# Patient Record
Sex: MALE | Race: WHITE | NOT HISPANIC OR LATINO | URBAN - METROPOLITAN AREA
[De-identification: names, ages, dates, MRNs, and addresses within clinical notes are randomized per-mention and may not be internally consistent; named-entity substitution may affect disease eponyms.]

---

## 2017-01-25 ENCOUNTER — COMMUNICATION - HEALTHEAST (OUTPATIENT)
Dept: FAMILY MEDICINE | Facility: CLINIC | Age: 67
End: 2017-01-25

## 2017-01-25 DIAGNOSIS — K21.9 GERD (GASTROESOPHAGEAL REFLUX DISEASE): ICD-10-CM

## 2017-01-25 DIAGNOSIS — E78.5 HYPERLIPEMIA: ICD-10-CM

## 2017-01-25 DIAGNOSIS — N40.0 BPH (BENIGN PROSTATIC HYPERPLASIA): ICD-10-CM

## 2017-04-23 ENCOUNTER — COMMUNICATION - HEALTHEAST (OUTPATIENT)
Dept: FAMILY MEDICINE | Facility: CLINIC | Age: 67
End: 2017-04-23

## 2017-04-23 DIAGNOSIS — K21.9 GERD (GASTROESOPHAGEAL REFLUX DISEASE): ICD-10-CM

## 2017-04-23 DIAGNOSIS — N40.0 BPH (BENIGN PROSTATIC HYPERPLASIA): ICD-10-CM

## 2017-04-23 DIAGNOSIS — E78.5 HYPERLIPEMIA: ICD-10-CM

## 2017-04-23 DIAGNOSIS — I10 UNSPECIFIED ESSENTIAL HYPERTENSION: ICD-10-CM

## 2017-06-08 ENCOUNTER — OFFICE VISIT - HEALTHEAST (OUTPATIENT)
Dept: FAMILY MEDICINE | Facility: CLINIC | Age: 67
End: 2017-06-08

## 2017-06-08 ENCOUNTER — COMMUNICATION - HEALTHEAST (OUTPATIENT)
Dept: FAMILY MEDICINE | Facility: CLINIC | Age: 67
End: 2017-06-08

## 2017-06-08 DIAGNOSIS — E78.5 HYPERLIPEMIA: ICD-10-CM

## 2017-06-08 DIAGNOSIS — Z00.00 ROUTINE GENERAL MEDICAL EXAMINATION AT A HEALTH CARE FACILITY: ICD-10-CM

## 2017-06-08 DIAGNOSIS — N40.0 BPH (BENIGN PROSTATIC HYPERPLASIA): ICD-10-CM

## 2017-06-08 DIAGNOSIS — K21.9 GERD (GASTROESOPHAGEAL REFLUX DISEASE): ICD-10-CM

## 2017-06-08 DIAGNOSIS — Z23 NEED FOR VACCINATION: ICD-10-CM

## 2017-06-08 DIAGNOSIS — I10 UNSPECIFIED ESSENTIAL HYPERTENSION: ICD-10-CM

## 2017-06-08 LAB
ALT SERPL W P-5'-P-CCNC: 126 U/L (ref 0–45)
CHOLEST SERPL-MCNC: 207 MG/DL
FASTING STATUS PATIENT QL REPORTED: YES
HDLC SERPL-MCNC: 40 MG/DL
LDLC SERPL CALC-MCNC: 134 MG/DL
PSA SERPL-MCNC: 3.7 NG/ML (ref 0–4.5)
TRIGL SERPL-MCNC: 167 MG/DL

## 2017-06-08 ASSESSMENT — MIFFLIN-ST. JEOR: SCORE: 2066.91

## 2017-06-08 NOTE — ASSESSMENT & PLAN NOTE
Controlled well on current proton pump inhibitor.  He has tried H2 blockers and failed in the past.  Will continue to watch kidney function as well as symptom control.

## 2017-06-08 NOTE — ASSESSMENT & PLAN NOTE
Tolerating Flomax well.  Will continue her current dosing.  Warning signs and symptoms for early return clinic discussed.

## 2018-05-28 ENCOUNTER — COMMUNICATION - HEALTHEAST (OUTPATIENT)
Dept: FAMILY MEDICINE | Facility: CLINIC | Age: 68
End: 2018-05-28

## 2018-05-28 DIAGNOSIS — K21.9 GERD (GASTROESOPHAGEAL REFLUX DISEASE): ICD-10-CM

## 2018-05-28 DIAGNOSIS — I10 ESSENTIAL HYPERTENSION: ICD-10-CM

## 2018-05-28 DIAGNOSIS — E78.5 HYPERLIPEMIA: ICD-10-CM

## 2018-08-08 ENCOUNTER — COMMUNICATION - HEALTHEAST (OUTPATIENT)
Dept: FAMILY MEDICINE | Facility: CLINIC | Age: 68
End: 2018-08-08

## 2018-08-08 DIAGNOSIS — N40.0 BPH (BENIGN PROSTATIC HYPERPLASIA): ICD-10-CM

## 2018-08-11 ENCOUNTER — COMMUNICATION - HEALTHEAST (OUTPATIENT)
Dept: FAMILY MEDICINE | Facility: CLINIC | Age: 68
End: 2018-08-11

## 2018-08-11 DIAGNOSIS — K21.9 GERD (GASTROESOPHAGEAL REFLUX DISEASE): ICD-10-CM

## 2018-08-11 DIAGNOSIS — I10 ESSENTIAL HYPERTENSION: ICD-10-CM

## 2018-08-11 DIAGNOSIS — E78.5 HYPERLIPEMIA: ICD-10-CM

## 2018-12-09 ENCOUNTER — COMMUNICATION - HEALTHEAST (OUTPATIENT)
Dept: FAMILY MEDICINE | Facility: CLINIC | Age: 68
End: 2018-12-09

## 2018-12-09 DIAGNOSIS — E78.5 HYPERLIPEMIA: ICD-10-CM

## 2018-12-09 DIAGNOSIS — I10 ESSENTIAL HYPERTENSION: ICD-10-CM

## 2018-12-09 DIAGNOSIS — K21.9 GERD (GASTROESOPHAGEAL REFLUX DISEASE): ICD-10-CM

## 2019-03-07 ENCOUNTER — COMMUNICATION - HEALTHEAST (OUTPATIENT)
Dept: FAMILY MEDICINE | Facility: CLINIC | Age: 69
End: 2019-03-07

## 2019-03-07 DIAGNOSIS — E78.5 HYPERLIPEMIA: ICD-10-CM

## 2019-03-07 DIAGNOSIS — I10 ESSENTIAL HYPERTENSION: ICD-10-CM

## 2019-03-07 DIAGNOSIS — K21.9 GERD (GASTROESOPHAGEAL REFLUX DISEASE): ICD-10-CM

## 2019-03-13 RX ORDER — HYDROCHLOROTHIAZIDE 25 MG/1
TABLET ORAL
Qty: 30 TABLET | Refills: 0 | Status: SHIPPED | OUTPATIENT
Start: 2019-03-13

## 2019-03-13 RX ORDER — SIMVASTATIN 10 MG
TABLET ORAL
Qty: 30 TABLET | Refills: 0 | Status: SHIPPED | OUTPATIENT
Start: 2019-03-13

## 2019-07-18 ENCOUNTER — COMMUNICATION - HEALTHEAST (OUTPATIENT)
Dept: FAMILY MEDICINE | Facility: CLINIC | Age: 69
End: 2019-07-18

## 2019-07-18 DIAGNOSIS — K21.9 GERD (GASTROESOPHAGEAL REFLUX DISEASE): ICD-10-CM

## 2019-07-18 DIAGNOSIS — I10 ESSENTIAL HYPERTENSION: ICD-10-CM

## 2019-07-26 ENCOUNTER — COMMUNICATION - HEALTHEAST (OUTPATIENT)
Dept: FAMILY MEDICINE | Facility: CLINIC | Age: 69
End: 2019-07-26

## 2019-07-26 DIAGNOSIS — N40.0 BPH (BENIGN PROSTATIC HYPERPLASIA): ICD-10-CM

## 2019-07-26 RX ORDER — TAMSULOSIN HYDROCHLORIDE 0.4 MG/1
0.4 CAPSULE ORAL DAILY
Qty: 90 CAPSULE | Refills: 3 | Status: SHIPPED | OUTPATIENT
Start: 2019-07-26

## 2021-05-30 NOTE — TELEPHONE ENCOUNTER
Requested Prescriptions     Pending Prescriptions Disp Refills     tamsulosin (FLOMAX) 0.4 mg cap 90 capsule 3     Sig: Take 1 capsule (0.4 mg total) by mouth daily.

## 2021-05-31 VITALS — WEIGHT: 295.3 LBS | BODY MASS INDEX: 43.74 KG/M2 | HEIGHT: 69 IN

## 2021-06-11 NOTE — PROGRESS NOTES
Assessment:      Annual Wellness Visit    1. Routine general medical examination at a health care facility  Basic Metabolic Panel    ALT (SGPT)    Lipid Cascade    PSA (Prostatic-Specific Antigen), Annual Screen   2. BPH (benign prostatic hyperplasia)  tamsulosin (FLOMAX) 0.4 mg Cp24    Basic Metabolic Panel    ALT (SGPT)    Lipid Cascade    PSA (Prostatic-Specific Antigen), Annual Screen   3. GERD (gastroesophageal reflux disease)  omeprazole (PRILOSEC) 20 MG capsule   4. Hyperlipemia  simvastatin (ZOCOR) 10 MG tablet    ALT (SGPT)    Lipid Cascade   5. Hypertension  hydroCHLOROthiazide (HYDRODIURIL) 25 MG tablet    Basic Metabolic Panel   6. Need for vaccination  Pneumococcal polysaccharide vaccine 23-valent 3 yo or older, subq/IM         Plan:         BPH (benign prostatic hyperplasia)  Tolerating Flomax well.  Will continue her current dosing.  Warning signs and symptoms for early return clinic discussed.    GERD (gastroesophageal reflux disease)  Controlled well on current proton pump inhibitor.  He has tried H2 blockers and failed in the past.  Will continue to watch kidney function as well as symptom control.    Hyperlipemia  Tolerating simvastatin very well.  Will continue her current dosing.  Will recheck levels as per guidelines    Hypertension  Diet and exercise reviewed.  Blood pressure well controlled without any signs of orthostatic hypotension on current dosing of hydrochlorothiazide 25 mg daily.  Will continue her current dosing.  Will check annual labs as per guidance.  Follow-up in 6 months for recheck.      Subjective:      Hugh Davidson is a 67 y.o. male who presents for a Subsequent Annual Wellness Visit.  Overall doing well.  Denies any chest pain, shortness breath, dyspnea exertion, palpitations, nausea or vomiting.  [facility].  A release of information was completed to bring those notes back over here.  They also do  help comanage with his hyperlipidemia and his blood pressure as well as his BPH.  [facility] is [facility].    Healthy Habits:   Regular Exercise: Yes  Sunscreen Use: Yes  Healthy Diet: Yes  Dental Visits Regularly: Yes  Seat Belt: Yes  Sexually active: Yes  Monthly Self Testicular Exams:  No  Hemoccults: No  Flex Sig: No  Colonoscopy: Yes  Lipid Profile: No  Glucose Screen: No  Prevention of Osteoporosis: No  Last Dexa: No  Guns at Home:  Yes  Guns Safety Locks:  Yes and Gun safe/class:  Yes      Immunization History   Administered Date(s) Administered     Influenza high dose, seasonal 10/21/2015     Influenza, inj, historic 09/01/2010, 10/12/2011, 10/26/2012     Pneumo Conj 13-V (2010&after) 10/21/2015     Pneumo Polysac 23-V 01/02/2006     Td, historic 10/01/2008     ZOSTER 02/09/2011     Immunization status: up to date and documented, due today.    Current Outpatient Prescriptions   Medication Sig Dispense Refill     hydroCHLOROthiazide (HYDRODIURIL) 25 MG tablet TAKE ONE TABLET BY MOUTH ONCE DAILY 30 tablet 0     omeprazole (PRILOSEC) 20 MG capsule TAKE ONE CAPSULE BY MOUTH ONCE DAILY 30 capsule 0     simvastatin (ZOCOR) 10 MG tablet TAKE ONE TABLET BY MOUTH ONCE DAILY AT BEDTIME 30 tablet 0     tamsulosin (FLOMAX) 0.4 mg Cp24 TAKE ONE CAPSULE BY MOUTH ONCE DAILY 90 capsule 0     No current facility-administered medications for this visit.      Past Medical History:   Diagnosis Date     GERD (gastroesophageal reflux disease)      Hypertension      Past Surgical History:   Procedure Laterality Date     HERNIA REPAIR       TONSILLECTOMY       Review of patient's allergies indicates no known allergies.  Family History   Problem Relation Age of Onset     Alcohol abuse Mother      Cancer Mother      Lung     Diverticulitis Father      Social History     Social History     Marital status: Patient Declined     Spouse name: N/A     Number of children: N/A     Years of education: N/A     Occupational  "History     Not on file.     Social History Main Topics     Smoking status: Current Some Day Smoker     Packs/day: 0.25     Years: 1.00     Types: Cigars     Smokeless tobacco: Never Used     Alcohol use Yes     1 Glasses of wine, 1 Shots of liquor per week     Drug use: No     Sexual activity: Yes     Partners: Female     Birth control/ protection: None     Other Topics Concern     Not on file     Social History Narrative       Current Diet:  well balanced diet  Amount Consumed Per Day  3 servings of meat each day  4 servings of dairy each day  3 servings of fruit  4 servings of vegetables  1 cups of coffee    Exercise Type: walking  Exercise Frequency: Daily exercise    Mood Disorder and Cognitive Impairment Screenings  Anxiety Screening Tool:  GAD7       Anxiety Screening Tool Score:  0  Depression Screening Tool:  PHQ9    Depression Screening Tool Score:  0  Cognitive Impairment and Additional Screening:  No difficulty.    Functional Ability/Level of Safety  Fall Risk Factors:  antihypertensive use  Home Safety Risk Factors: no grab bars, bathroom    Advanced Directive:  I have had an Advanced Directive discussion with the patient. End of life decisions were reviewed with the patient and I agree with the patient's decisions.    Co-Managers and Medical Equipment/Suppliers:  [facility]    Review of Systems  Review of Systems   All other systems reviewed and are negative.            Objective:     Vitals:    06/08/17 0914   BP: 110/80   Pulse: 64   Resp: 12   Temp: 97.6  F (36.4  C)   TempSrc: Oral   Weight: (!) 295 lb 4.8 oz (133.9 kg)   Height: 5' 8.5\" (1.74 m)           Physical Exam   Constitutional: He appears well-developed and well-nourished.   HENT:   Head: Normocephalic and atraumatic.   Right Ear: External ear normal.   Left Ear: External ear normal.   Nose: Nose normal.   Mouth/Throat: Oropharynx is clear and moist.   Eyes: Conjunctivae and EOM are " normal. Pupils are equal, round, and reactive to light.   Neck: Normal range of motion.   Cardiovascular: Normal rate, regular rhythm, normal heart sounds and intact distal pulses.    Pulmonary/Chest: Effort normal and breath sounds normal.   Abdominal: Soft.   Neurological: He has normal reflexes.   Skin: Skin is warm and dry.   Psychiatric: He has a normal mood and affect.   Vitals reviewed.

## 2021-06-16 PROBLEM — E78.5 HYPERLIPEMIA: Status: ACTIVE | Noted: 2017-06-08

## 2021-06-16 PROBLEM — I10 ESSENTIAL HYPERTENSION: Status: ACTIVE | Noted: 2017-06-08

## 2021-06-16 PROBLEM — K21.9 GERD (GASTROESOPHAGEAL REFLUX DISEASE): Status: ACTIVE | Noted: 2017-06-08

## 2021-06-16 PROBLEM — N40.0 BPH (BENIGN PROSTATIC HYPERPLASIA): Status: ACTIVE | Noted: 2017-06-08

## 2021-06-24 NOTE — TELEPHONE ENCOUNTER
RN cannot approve Refill Request    RN can NOT refill this medication Protocol failed and NO refill given. Last office visit: 11/12/2015 Golden Hawkins DO Last Physical: 6/8/2017 Last MTM visit: Visit date not found Last visit same specialty: 11/12/2015 Golden Hawkins DO.  Next visit within 3 mo: Visit date not found  Next physical within 3 mo: Visit date not found      Brooke Rooney, [facility] Triage/Med Refill 3/13/2019    Requested Prescriptions   Pending Prescriptions Disp Refills     simvastatin (ZOCOR) 10 MG tablet [Pharmacy Med Name: SIMVASTATIN 10MG    TAB] 90 tablet 0     Sig: TAKE 1 TABLET BY MOUTH ONCE DAILY AT BEDTIME    Statins Refill Protocol (Hmg CoA Reductase Inhibitors) Failed - 3/7/2019 12:12 PM       Failed - PCP or prescribing provider visit in past 12 months     Last office visit with prescriber/PCP: 11/12/2015 Golden Hawkins DO OR same dept: Visit date not found OR same specialty: 11/12/2015 Golden Hawkins DO  Last physical: 6/8/2017 Last MTM visit: Visit date not found   Next visit within 3 mo: Visit date not found  Next physical within 3 mo: Visit date not found  Prescriber OR PCP: Golden Hawkins DO  Last diagnosis associated with med order: 1. Hyperlipemia  - simvastatin (ZOCOR) 10 MG tablet [Pharmacy Med Name: SIMVASTATIN 10MG    TAB]; TAKE 1 TABLET BY MOUTH ONCE DAILY AT BEDTIME  Dispense: 90 tablet; Refill: 0    2. Essential hypertension  - hydroCHLOROthiazide (HYDRODIURIL) 25 MG tablet [Pharmacy Med Name: HYDROCHLOROT 25MG   TAB]; TAKE 1 TABLET BY MOUTH ONCE DAILY  Dispense: 90 tablet; Refill: 0    3. GERD (gastroesophageal reflux disease)  - omeprazole (PRILOSEC) 20 MG capsule [Pharmacy Med Name: OMEPRAZOLE 20MG CAP]; TAKE 1 CAPSULE BY MOUTH ONCE DAILY  Dispense: 90 capsule; Refill: 0    If protocol passes may refill for 12 months if within 3 months of last provider visit (or a total of 15 months).              hydroCHLOROthiazide (HYDRODIURIL) 25 MG tablet [Pharmacy Med Name: HYDROCHLOROT 25MG   TAB] 90 tablet 0     Sig: TAKE 1 TABLET BY MOUTH ONCE DAILY    Diuretics/Combination Diuretics Refill Protocol  Failed - 3/7/2019 12:12 PM       Failed - Visit with PCP or prescribing provider visit in past 12 months    Last office visit with prescriber/PCP: 11/12/2015 Golden Hawkins DO OR same dept: Visit date not found OR same specialty: 11/12/2015 Golden Hawkins DO  Last physical: 6/8/2017 Last MTM visit: Visit date not found   Next visit within 3 mo: Visit date not found  Next physical within 3 mo: Visit date not found  Prescriber OR PCP: Golden Hawkins DO  Last diagnosis associated with med order: 1. Hyperlipemia  - simvastatin (ZOCOR) 10 MG tablet [Pharmacy Med Name: SIMVASTATIN 10MG    TAB]; TAKE 1 TABLET BY MOUTH ONCE DAILY AT BEDTIME  Dispense: 90 tablet; Refill: 0    2. Essential hypertension  - hydroCHLOROthiazide (HYDRODIURIL) 25 MG tablet [Pharmacy Med Name: HYDROCHLOROT 25MG   TAB]; TAKE 1 TABLET BY MOUTH ONCE DAILY  Dispense: 90 tablet; Refill: 0    3. GERD (gastroesophageal reflux disease)  - omeprazole (PRILOSEC) 20 MG capsule [Pharmacy Med Name: OMEPRAZOLE 20MG CAP]; TAKE 1 CAPSULE BY MOUTH ONCE DAILY  Dispense: 90 capsule; Refill: 0    If protocol passes may refill for 12 months if within 3 months of last provider visit (or a total of 15 months).            Failed - Serum Potassium in past 12 months     No results found for: LN-POTASSIUM         Failed - Serum Sodium in past 12 months     No results found for: LN-SODIUM         Failed - Blood pressure on file in past 12 months    BP Readings from Last 1 Encounters:   06/08/17 110/80            Failed - Serum Creatinine in past 12 months     Creatinine   Date Value Ref Range Status   06/08/2017 0.87 0.70 - 1.30 mg/dL Final             omeprazole (PRILOSEC) 20 MG capsule [Pharmacy Med Name: OMEPRAZOLE 20MG CAP] 90 capsule 0      Sig: TAKE 1 CAPSULE BY MOUTH ONCE DAILY    GI Medications Refill Protocol Failed - 3/7/2019 12:12 PM       Failed - PCP or prescribing provider visit in last 12 or next 3 months.    Last office visit with prescriber/PCP: 11/12/2015 Golden Hawkins DO OR same dept: Visit date not found OR same specialty: 11/12/2015 Golden Hawkins DO  Last physical: 6/8/2017 Last MTM visit: Visit date not found   Next visit within 3 mo: Visit date not found  Next physical within 3 mo: Visit date not found  Prescriber OR PCP: Golden Hawkins DO  Last diagnosis associated with med order: 1. Hyperlipemia  - simvastatin (ZOCOR) 10 MG tablet [Pharmacy Med Name: SIMVASTATIN 10MG    TAB]; TAKE 1 TABLET BY MOUTH ONCE DAILY AT BEDTIME  Dispense: 90 tablet; Refill: 0    2. Essential hypertension  - hydroCHLOROthiazide (HYDRODIURIL) 25 MG tablet [Pharmacy Med Name: HYDROCHLOROT 25MG   TAB]; TAKE 1 TABLET BY MOUTH ONCE DAILY  Dispense: 90 tablet; Refill: 0    3. GERD (gastroesophageal reflux disease)  - omeprazole (PRILOSEC) 20 MG capsule [Pharmacy Med Name: OMEPRAZOLE 20MG CAP]; TAKE 1 CAPSULE BY MOUTH ONCE DAILY  Dispense: 90 capsule; Refill: 0    If protocol passes may refill for 12 months if within 3 months of last provider visit (or a total of 15 months).

## 2021-08-22 ENCOUNTER — HEALTH MAINTENANCE LETTER (OUTPATIENT)
Age: 71
End: 2021-08-22

## 2021-10-17 ENCOUNTER — HEALTH MAINTENANCE LETTER (OUTPATIENT)
Age: 71
End: 2021-10-17

## 2022-10-02 ENCOUNTER — HEALTH MAINTENANCE LETTER (OUTPATIENT)
Age: 72
End: 2022-10-02

## 2023-10-21 ENCOUNTER — HEALTH MAINTENANCE LETTER (OUTPATIENT)
Age: 73
End: 2023-10-21